# Patient Record
Sex: FEMALE | Race: WHITE
[De-identification: names, ages, dates, MRNs, and addresses within clinical notes are randomized per-mention and may not be internally consistent; named-entity substitution may affect disease eponyms.]

---

## 2017-01-01 ENCOUNTER — HOSPITAL ENCOUNTER (EMERGENCY)
Dept: HOSPITAL 62 - ER | Age: 42
Discharge: HOME | End: 2017-01-01
Payer: SELF-PAY

## 2017-01-01 VITALS — SYSTOLIC BLOOD PRESSURE: 128 MMHG | DIASTOLIC BLOOD PRESSURE: 78 MMHG

## 2017-01-01 DIAGNOSIS — R52: ICD-10-CM

## 2017-01-01 DIAGNOSIS — R10.817: ICD-10-CM

## 2017-01-01 DIAGNOSIS — R50.9: ICD-10-CM

## 2017-01-01 DIAGNOSIS — Z87.891: ICD-10-CM

## 2017-01-01 DIAGNOSIS — J44.9: ICD-10-CM

## 2017-01-01 DIAGNOSIS — R11.2: Primary | ICD-10-CM

## 2017-01-01 LAB
ALBUMIN SERPL-MCNC: 3.7 G/DL (ref 3.5–5)
ALP SERPL-CCNC: 69 U/L (ref 38–126)
ALT SERPL-CCNC: 39 U/L (ref 9–52)
ANION GAP SERPL CALC-SCNC: 9 MMOL/L (ref 5–19)
APPEARANCE UR: (no result)
AST SERPL-CCNC: 21 U/L (ref 14–36)
BASOPHILS # BLD AUTO: 0.1 10^3/UL (ref 0–0.2)
BASOPHILS NFR BLD AUTO: 0.7 % (ref 0–2)
BILIRUB DIRECT SERPL-MCNC: 0 MG/DL (ref 0–0.3)
BILIRUB SERPL-MCNC: 0.6 MG/DL (ref 0.2–1.3)
BILIRUB UR QL STRIP: NEGATIVE
BUN SERPL-MCNC: 12 MG/DL (ref 7–20)
CALCIUM: 8.9 MG/DL (ref 8.4–10.2)
CHLORIDE SERPL-SCNC: 103 MMOL/L (ref 98–107)
CO2 SERPL-SCNC: 27 MMOL/L (ref 22–30)
CREAT SERPL-MCNC: 0.83 MG/DL (ref 0.52–1.25)
EOSINOPHIL # BLD AUTO: 0.5 10^3/UL (ref 0–0.6)
EOSINOPHIL NFR BLD AUTO: 5.2 % (ref 0–6)
ERYTHROCYTE [DISTWIDTH] IN BLOOD BY AUTOMATED COUNT: 14 % (ref 11.5–14)
GLUCOSE SERPL-MCNC: 108 MG/DL (ref 75–110)
GLUCOSE UR STRIP-MCNC: NEGATIVE MG/DL
HCT VFR BLD CALC: 42 % (ref 36–47)
HGB BLD-MCNC: 14.3 G/DL (ref 12–15.5)
HGB HCT DIFFERENCE: 0.9
KETONES UR STRIP-MCNC: NEGATIVE MG/DL
LYMPHOCYTES # BLD AUTO: 3.1 10^3/UL (ref 0.5–4.7)
LYMPHOCYTES NFR BLD AUTO: 32.4 % (ref 13–45)
MCH RBC QN AUTO: 30.9 PG (ref 27–33.4)
MCHC RBC AUTO-ENTMCNC: 34 G/DL (ref 32–36)
MCV RBC AUTO: 91 FL (ref 80–97)
MONOCYTES # BLD AUTO: 0.8 10^3/UL (ref 0.1–1.4)
MONOCYTES NFR BLD AUTO: 8.1 % (ref 3–13)
NEUTROPHILS # BLD AUTO: 5.1 10^3/UL (ref 1.7–8.2)
NEUTS SEG NFR BLD AUTO: 53.6 % (ref 42–78)
NITRITE UR QL STRIP: NEGATIVE
PH UR STRIP: 5 [PH] (ref 5–9)
POTASSIUM SERPL-SCNC: 4.1 MMOL/L (ref 3.6–5)
PROT SERPL-MCNC: 6 G/DL (ref 6.3–8.2)
PROT UR STRIP-MCNC: NEGATIVE MG/DL
RBC # BLD AUTO: 4.63 10^6/UL (ref 3.72–5.28)
SODIUM SERPL-SCNC: 138.7 MMOL/L (ref 137–145)
SP GR UR STRIP: 1.02
UROBILINOGEN UR-MCNC: NEGATIVE MG/DL (ref ?–2)
WBC # BLD AUTO: 9.5 10^3/UL (ref 4–10.5)

## 2017-01-01 PROCEDURE — 81025 URINE PREGNANCY TEST: CPT

## 2017-01-01 PROCEDURE — 99283 EMERGENCY DEPT VISIT LOW MDM: CPT

## 2017-01-01 PROCEDURE — 81001 URINALYSIS AUTO W/SCOPE: CPT

## 2017-01-01 PROCEDURE — 80053 COMPREHEN METABOLIC PANEL: CPT

## 2017-01-01 PROCEDURE — 85025 COMPLETE CBC W/AUTO DIFF WBC: CPT

## 2017-01-01 PROCEDURE — 96374 THER/PROPH/DIAG INJ IV PUSH: CPT

## 2017-01-01 PROCEDURE — 36415 COLL VENOUS BLD VENIPUNCTURE: CPT

## 2017-01-01 PROCEDURE — 96361 HYDRATE IV INFUSION ADD-ON: CPT

## 2017-01-01 PROCEDURE — 71020: CPT

## 2017-01-01 NOTE — ER DOCUMENT REPORT
ED General





- General


Chief Complaint: Flu Symptoms


Stated Complaint: FEVER/VOMITING/DIARRHEA


Time seen by provider: 19:40


Notes: 


Patient is a 41-year-old female that comes emergency department with chief 

complaint of fever and hurting all over today with nausea, she states that 

yesterday she was having nausea and vomiting, also vomited today, denies 

diarrhea, she states that she had a cough but this also seems to be improving.  

Patient denies any specific areas of abdominal pain, denies neck pain, flank 

pain, headache.  Patient unsure of sick contacts.  PMH of PCOS, takes no daily 

medications.





TRAVEL OUTSIDE OF THE [place] IN LAST 30 DAYS: No





- Related Data


Allergies/Adverse Reactions: 


 





Antihistamines - Alkylamine Adverse Reaction (Verified 12/04/16 05:18)


 


"allergy medicine" Adverse Reaction (Uncoded 04/22/16 13:43)


 











Past Medical History





- General


Information source: Patient





- Social History


Smoking Status: Former Smoker


Frequency of alcohol use: None


Drug Abuse: None


Lives with: Family


Family History: Reviewed & Not Pertinent


Pulmonary Medical History: Reports: Hx COPD


Neurological Medical History: Reports: Hx Migraine


Renal/ Medical History: Reports: Hx Kidney Stones, Hx Ovarian Cysts


GI Medical History: Reports: Hx Gastroesophageal Reflux Disease


Musculoskeltal Medical History: Reports Hx Arthritis


Psychiatric Medical History: Reports: Hx Depression


Past Surgical History: Reports: Hx Cholecystectomy, Hx Gynecologic Surgery - 

ovarian surgery, Hx Hysterectomy





- Immunizations


Immunizations up to date: Yes


Hx Diphtheria, Pertussis, Tetanus Vaccination: Yes





Review of Systems





- Review of Systems


Constitutional: See HPI


EENT: No symptoms reported


Cardiovascular: No symptoms reported


Respiratory: See HPI


Gastrointestinal: See HPI


Genitourinary: No symptoms reported


Female Genitourinary: No symptoms reported


Musculoskeletal: See HPI


Skin: No symptoms reported


Hematologic/Lymphatic: No symptoms reported


Neurological/Psychological: No symptoms reported





Physical Exam





- Vital signs


Vitals: 


 











Temp Pulse Resp BP Pulse Ox


 


 98.3 F   70   17   128/78 H  100 


 


 01/01/17 22:53  01/01/17 22:53  01/01/17 22:53  01/01/17 22:53  01/01/17 22:53











Interpretation: Normal





- General


General appearance: Alert, Other - Patient slightly disheveled and slightly 

under the weather in appearance, no distress


In distress: None





- HEENT


Head: Normocephalic, Atraumatic


Eyes: Normal


Conjunctiva: Normal


Extraocular movements intact: Yes


Eyelashes: Normal


Pupils: PERRL


Ears: Normal


External canal: Normal


Tympanic membrane: Normal


Sinus: Normal


Nasal: Normal


Mouth/Lips: Normal


Mucous membranes: Normal


Pharynx: Normal


Neck: Normal





- Respiratory


Respiratory status: No respiratory distress


Chest status: Nontender


Breath sounds: Normal


Chest palpation: Normal





- Cardiovascular


Rhythm: Regular


Heart sounds: Normal auscultation


Murmur: No





- Abdominal


Inspection: Normal


Distension: No distension


Bowel sounds: Normal


Tenderness: Tender - Very mild generalized tenderness, nonspecific, no guarding


Organomegaly: No organomegaly





- Back


Back: Normal, Nontender.  No: Tender





- Extremities


General upper extremity: Normal inspection, Nontender, Normal color, Normal ROM

, Normal temperature


General lower extremity: Normal inspection, Nontender, Normal color, Normal ROM

, Normal temperature, Normal weight bearing.  No: Sheridan's sign





- Neurological


Neuro grossly intact: Yes


Cognition: Normal


Orientation: AAOx4


[place] Coma Scale Eye Opening: Spontaneous


[place] Coma Scale Verbal: Oriented


[place] Coma Scale Motor: Obeys Commands


[place] Coma Scale Total: 15


Speech: Normal


Motor strength normal: LUE, RUE, LLE, RLE


Sensory: Normal





- Psychological


Associated symptoms: Normal affect, Normal mood





- Skin


Skin Temperature: Warm


Skin Moisture: Dry


Skin Color: Normal





Course





- Re-evaluation


Re-evalutation: 


CBC, chemistry, urinalysis unremarkable, chest x-ray unremarkable, suspect 

patient has a viral syndrome.  Treated with antinausea medications, provided 

with work release note.  Discussed with patient, she states she'll follow-up 

with primary care and return for any concerning symptoms.





- Vital Signs


Vital signs: 


 











Temp Pulse Resp BP Pulse Ox


 


 98.3 F   70   17   128/78 H  100 


 


 01/01/17 22:53  01/01/17 22:53  01/01/17 22:53  01/01/17 22:53  01/01/17 22:53














- Laboratory


Result Diagrams: 


 01/01/17 21:00





 01/01/17 21:00


Laboratory results interpreted by me: 


 











  01/01/17 01/01/17





  21:00 21:00


 


Total Protein  6.0 L 


 


Ur Leukocyte Esterase   TRACE H














Discharge





- Discharge


Clinical Impression: 


 Nausea, Body aches





Condition: Stable


Disposition: HOME, SELF-CARE


Additional Instructions: 


Your workup is most consistent with a viral illness. 


Take Zofran or Phenergan for nausea, take Tylenol or ibuprofen for fever, drink 

plenty of fluids and rest.


Follow-up with primary care.


Return to emergency department for any concerning or worsening symptoms.


Prescriptions: 


Promethazine HCl [Phenergan 25 mg Tablet] 1 - 2 tab PO Q6H PRN #20 tablet


 PRN Reason: 


Forms:  Return to Work

## 2017-02-11 ENCOUNTER — HOSPITAL ENCOUNTER (EMERGENCY)
Dept: HOSPITAL 62 - ER | Age: 42
Discharge: HOME | End: 2017-02-11
Payer: SELF-PAY

## 2017-02-11 VITALS — SYSTOLIC BLOOD PRESSURE: 118 MMHG | DIASTOLIC BLOOD PRESSURE: 74 MMHG
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
## 2017-02-11 DIAGNOSIS — F17.210: ICD-10-CM

## 2017-02-11 DIAGNOSIS — T18.9XXA: Primary | ICD-10-CM

## 2017-02-11 DIAGNOSIS — J02.9: ICD-10-CM

## 2017-02-11 DIAGNOSIS — E04.1: ICD-10-CM

## 2017-02-11 PROCEDURE — 70360 X-RAY EXAM OF NECK: CPT

## 2017-02-11 PROCEDURE — 70491 CT SOFT TISSUE NECK W/DYE: CPT

## 2017-02-11 PROCEDURE — 99283 EMERGENCY DEPT VISIT LOW MDM: CPT

## 2017-02-11 NOTE — ER DOCUMENT REPORT
HPI





- HPI


Patient complains to provider of: swallowed glass


Onset: This afternoon


Onset/Duration: Sudden


Quality of pain: Burning


Severity: Severe


Pain Level: 4


Context: 


Patient presents to the emergency department with complaints of sore throat 

after eating a chili dog confining glass in it from [employer].  Patient 

presents with picture of a piece of glass approximately 1 mm on the tip of her 

finger.  Patient is speaking in clear voice.  Patient denies spitting up blood.

  Patient reports something feels like it stuck in her throat.


Associated Symptoms: None


Exacerbated by: Denies


Relieved by: Denies


Similar symptoms previously: No


Recently seen / treated by doctor: No





- REPRODUCTIVE


Reproductive: DENIES: Pregnant:





- DERM


Skin Color: Normal, Pink





Past Medical History





- General


Information source: Patient


Last Menstrual Period: jan





- Social History


Smoking Status: Current Every Day Smoker


Cigarette use (# per day): Yes


Frequency of alcohol use: None


Drug Abuse: None


Occupation: [employer]


Family History: Reviewed & Not Pertinent, Thyroid Disfunction - brother


Patient has suicidal ideation: No


Patient has homicidal ideation: No


Pulmonary Medical History: Reports: Hx COPD


Neurological Medical History: Reports: Hx Migraine


Renal/ Medical History: Reports: Hx Kidney Stones, Hx Ovarian Cysts.  Denies: 

Hx Peritoneal Dialysis


GI Medical History: Reports: Hx Gastroesophageal Reflux Disease


Musculoskeltal Medical History: Reports Hx Arthritis


Psychiatric Medical History: Reports: Hx Depression


Past Surgical History: Reports: Hx Cholecystectomy, Hx Gynecologic Surgery - 

ovarian surgery, Hx Hysterectomy





- Immunizations


Immunizations up to date: Yes


Hx Diphtheria, Pertussis, Tetanus Vaccination: Yes





Vertical Provider Document





- CONSTITUTIONAL


Agree With Documented VS: Yes


Exam Limitations: No Limitations


General Appearance: WD/WN, No Apparent Distress





- INFECTION CONTROL


TRAVEL OUTSIDE OF THE [place] IN LAST 30 DAYS: No





- HEENT


HEENT: Atraumatic, Normocephalic.  negative: Pharyngeal Erythema - No 

peritonsillar abscess good clear voice no trismus, opens mouth wide, no 

lacerations noted, drinking mountain dew





- NECK


Neck: Normal Inspection, Supple.  negative: Lymphadenopathy-Left, 

Lymphadenopathy-Right





- RESPIRATORY


Respiratory: Breath Sounds Normal, No Respiratory Distress


O2 Sat by Pulse Oximetry: 97





- CARDIOVASCULAR


Cardiovascular: Regular Rate





- MUSCULOSKELETAL/EXTREMETIES


Musculoskeletal/Extremeties: MAEW, FROM





- NEURO


Level of Consciousness: Awake, Alert, Appropriate


Motor/Sensory: No Motor Deficit





- DERM


Integumentary: Warm, Dry





Course





- Re-evaluation


Re-evalutation: 





02/11/17 18:28


I have consulted  the attending provider dr oliveira per [facility] guidelines, he agrees 

with ct soft tissue


Reviewed results with Dr Oliveira, pt advised to fu with pcp, [facility] for 

further eval of thyroid nodule. She was instructed to return to ED if unable to 

obtain fu or problems swallowing, she was also instructed to monitor her stool 

for blood. She verbalized understanding.





- Vital Signs


Vital signs: 


 











Temp Pulse Resp BP Pulse Ox


 


 98.1 F   86   20   117/75   97 


 


 02/11/17 13:39  02/11/17 13:39  02/11/17 13:39  02/11/17 13:39  02/11/17 13:39














- Diagnostic Test


Radiology reviewed: Image reviewed, Reports reviewed - IMPRESSION: 1. No CT 

evidence of esophageal foreign body.  2. Nonspecific thyroid nodules as 

detailed above. Consider routine thyroid ultrasound on an  outpatient basis.





Discharge





- Discharge


Clinical Impression: 


 Sore throat, swallowed glass, Thyroid nodule





Condition: Stable


Disposition: HOME, SELF-CARE


Instructions:  Sore Throat ([facility]), Family Physicians / Practices


Additional Instructions: 


*You have been evaluated for after swallowing glass , thyroid nodule


*Warm salt water gargles and throat lozenges for comfort


*Follow-up with your primary care provider within one week for a recheck of 

your throat and evaluation of thyroid with [facility]


*Return to ED for worsening condition change, needs, difficulty swallowing, 

concerns


Forms:  Return to Work

## 2017-06-30 ENCOUNTER — HOSPITAL ENCOUNTER (EMERGENCY)
Dept: HOSPITAL 62 - ER | Age: 42
Discharge: HOME | End: 2017-06-30
Payer: SELF-PAY

## 2017-06-30 VITALS — DIASTOLIC BLOOD PRESSURE: 77 MMHG | SYSTOLIC BLOOD PRESSURE: 134 MMHG

## 2017-06-30 DIAGNOSIS — H92.02: ICD-10-CM

## 2017-06-30 DIAGNOSIS — J44.9: ICD-10-CM

## 2017-06-30 DIAGNOSIS — H60.502: Primary | ICD-10-CM

## 2017-06-30 DIAGNOSIS — Z87.891: ICD-10-CM

## 2017-06-30 PROCEDURE — 99282 EMERGENCY DEPT VISIT SF MDM: CPT

## 2017-06-30 NOTE — ER DOCUMENT REPORT
HPI





- HPI


Patient complains to provider of: left ear pain, rash


Pain Level: 5


Context: 


Patient is a 41-year-old female who comes emergency department for chief 

complaint of left ear pain, she states that this has progressively worsened 

over the past several days, she states that there is also a pimple-like area 

inside of the earlobe that she scratched open and now the area around this has 

become red.  She wonders if she was originally bitten by an insect.  Up-to-date 

on tetanus within 5 years. she denies any fever or chills, drainage from the ear

, dizziness, vomiting, injury, or recent swimming.








- REPRODUCTIVE


Reproductive: DENIES: Pregnant:





- DERM


Skin Color: Normal





Past Medical History





- General


Information source: Patient





- Social History


Smoking Status: Former Smoker


Drug Abuse: None


Lives with: Family


Family History: Reviewed & Not Pertinent, Thyroid Disfunction - brother


Patient has suicidal ideation: No


Patient has homicidal ideation: No


Pulmonary Medical History: Reports: Hx COPD


Neurological Medical History: Reports: Hx Migraine


Renal/ Medical History: Reports: Hx Kidney Stones, Hx Ovarian Cysts.  Denies: 

Hx Peritoneal Dialysis


GI Medical History: Reports: Hx Gastroesophageal Reflux Disease


Musculoskeltal Medical History: Reports Hx Arthritis


Psychiatric Medical History: Reports: Hx Depression


Past Surgical History: Reports: Hx Cholecystectomy, Hx Gynecologic Surgery - 

ovarian surgery, Hx Hysterectomy





- Immunizations


Immunizations up to date: Yes


Hx Diphtheria, Pertussis, Tetanus Vaccination: Yes





Vertical Provider Document





- CONSTITUTIONAL


General Appearance: WD/WN, Mild Distress - Patient appears to be in some pain, 

holding her left ear





- INFECTION CONTROL


TRAVEL OUTSIDE OF THE [place] IN LAST 30 DAYS: No





- HEENT


HEENT: Atraumatic, Normocephalic.  negative: Normal ENT Exam - Eardrums 

unremarkable bilaterally, left ear canal very swollen with erythema, no abscess 

or foreign body noted, no drainage.  Tragus is tender, there is also erythema 

inside of the earlobe with an excoriated area, no induration or fluctuance.  

Mastoid normal.





- NECK


Neck: Normal Inspection





- RESPIRATORY


Respiratory: Breath Sounds Normal, No Respiratory Distress


O2 Sat by Pulse Oximetry: 98





- CARDIOVASCULAR


Cardiovascular: Regular Rate, Regular Rhythm





- GI/ABDOMEN


Gastrointestinal: Abdomen Soft, Abdomen Non-Tender





- MUSCULOSKELETAL/EXTREMETIES


Musculoskeletal/Extremeties: MAEW, FROM, Non-Tender





- NEURO


Level of Consciousness: Awake, Alert, Appropriate





- DERM


Integumentary: Warm, Dry, No Rash





Course





- Re-evaluation


Re-evalutation: 


There is an excoriated area over the external ear with some mild surrounding 

redness but no induration or fluctuance.  Covering the Bactrim.  Ear wick 

placed for otitis externa, patient given Ciprodex drops to continue to use at 

home, patient will be given pain medication, discussed follow-up, discussed 

return precautions, patient states understanding and agreement





- Vital Signs


Vital signs: 


 











Temp Pulse Resp BP Pulse Ox


 


 98.6 F   105 H  18   140/87 H  98 


 


 06/30/17 20:14  06/30/17 20:14  06/30/17 20:14  06/30/17 20:14  06/30/17 20:14














Discharge





- Discharge


Clinical Impression: 


 Left ear pain





Otitis externa


Qualifiers:


 Otitis externa type: unspecified type Chronicity: acute Laterality: left 

Qualified Code(s): H60.502 - Unspecified acute noninfective otitis externa, 

left ear





Condition: Stable


Disposition: HOME, SELF-CARE


Instructions:  Use of Ear Drops ([facility]), Otitis Externa ([facility]), Using Ear Drops 

with a Wick ([facility])


Additional Instructions: 


Examination shows infection of the ear canal, otitis externa, use the wick, 

apply the drops in the wick as the insert describes (take drops for 1 week), 

also take the Bactrim antibiotic from localized skin infection on the outside 

of the ear.  Take pain medication given if needed.  Return to emergency 

department for any concerning or worsening symptoms including dizziness, 

vomiting, fever, swelling of the ear or behind the ear, or any other concerning 

symptoms.


Prescriptions: 


Hydrocodone/Acetaminophen [Norco 5-325 mg Tablet] 1 - 2 tab PO ASDIR #10 tablet


Sulfamethoxazole/Trimethoprim [Bactrim Ds Tablet] 1 each PO BID #14 tablet

## 2018-08-08 ENCOUNTER — HOSPITAL ENCOUNTER (EMERGENCY)
Dept: HOSPITAL 62 - ER | Age: 43
LOS: 1 days | Discharge: HOME | End: 2018-08-09
Payer: SELF-PAY

## 2018-08-08 DIAGNOSIS — L03.116: Primary | ICD-10-CM

## 2018-08-08 DIAGNOSIS — J44.9: ICD-10-CM

## 2018-08-08 PROCEDURE — 99281 EMR DPT VST MAYX REQ PHY/QHP: CPT

## 2018-08-09 VITALS — DIASTOLIC BLOOD PRESSURE: 89 MMHG | SYSTOLIC BLOOD PRESSURE: 147 MMHG

## 2018-08-09 NOTE — ER DOCUMENT REPORT
ED General





- General


Chief Complaint: Insect Bite


Stated Complaint: INFECTED BITE,BACK PAIN


Time Seen by Provider: 08/09/18 00:33


Notes: 





Patient is a 43-year-old female who presents with complaint of a red spot on 

her left thigh that she thinks may be infected.  She denies any fevers.  She 

has been there for about 2 days.  No other complaints at this time.  No 

previous history of abscesses.  She does not member pulling any bugs off or any 

ticks off her in this area.


TRAVEL OUTSIDE OF THE [place] IN LAST 30 DAYS: No





- Related Data


Allergies/Adverse Reactions: 


 





Antihistamines - Alkylamine Adverse Reaction (Verified 12/04/16 05:18)


 


"allergy medicine" Adverse Reaction (Uncoded 04/22/16 13:43)


 











Past Medical History





- Social History


Smoking Status: Unknown if Ever Smoked


Frequency of alcohol use: None


Drug Abuse: None


Family History: Reviewed & Not Pertinent, Thyroid Disfunction - brother


Pulmonary Medical History: Reports: Hx COPD


Neurological Medical History: Reports: Hx Migraine


Renal/ Medical History: Reports: Hx Kidney Stones, Hx Ovarian Cysts.  Denies: 

Hx Peritoneal Dialysis


GI Medical History: Reports: Hx Gastroesophageal Reflux Disease


Musculoskeletal Medical History: Reports Hx Arthritis


Psychiatric Medical History: Reports: Hx Depression


Past Surgical History: Reports: Hx Cholecystectomy, Hx Gynecologic Surgery - 

ovarian surgery, Hx Hysterectomy





- Immunizations


Immunizations up to date: Yes


Hx Diphtheria, Pertussis, Tetanus Vaccination: Yes





Review of Systems





- Review of Systems


Notes: 





My Normal Review Basic





REVIEW OF SYSTEMS:


CONSTITUTIONAL :  Denies fever,  chills, or sweats.  Denies recent illness.


MUSCULOSKELETAL: Painful red lesion on left thigh.


SKIN:   Denies rash or skin lesions.


ALL OTHER SYSTEMS REVIEWED AND NEGATIVE.





Physical Exam





- Vital signs


Vitals: 


 











Temp Pulse Resp BP Pulse Ox


 


 98.0 F   92   18   133/86 H  98 


 


 08/08/18 23:01  08/08/18 23:01  08/08/18 23:01  08/08/18 23:01  08/08/18 23:01














- Notes


Notes: 





General Appearance: Well nourished, alert, cooperative, no acute distress, no 

obvious discomfort.


Vitals: reviewed, See vital signs table.


Eyes: PERRL, EOMI, Conjuctiva clear


Extremities:  good pulses in all extremities, shins has a 1 cm area of 

induration with approximately 3 cm of surrounding erythema over the left thigh.

  No significant fluctuance.  Consistent with infected hair follicle.


Skin: warm, dry, appropriate color, no rash


Neuro: speech clear, oriented x 3, normal affect, responds appropriately to 

questions.





Course





- Re-evaluation


Re-evalutation: 





08/09/18 07:24


Point quick needle aspiration over the area of induration see if there is an 

associated pus.  There is no pus aspirated.  Patient was placed on clindamycin.

  She is encouraged to return to ER if she has spreading redness, increasing 

swelling or induration, fevers, or she feels that she is worsening in any way.  

Patient agrees with plan will be discharged home.





Dictation of this chart was performed using voice recognition software; 

therefore, there may be some unintended grammatical errors.





- Vital Signs


Vital signs: 


 











Temp Pulse Resp BP Pulse Ox


 


 97.8 F   81   16   147/89 H  97 


 


 08/09/18 01:41  08/09/18 01:41  08/09/18 01:41  08/09/18 01:41  08/09/18 01:41














Discharge





- Discharge


Clinical Impression: 


 Hair follicle infection





Cellulitis


Qualifiers:


 Site of cellulitis: extremity Site of cellulitis of extremity: lower extremity 

Laterality: left Qualified Code(s): L03.116 - Cellulitis of left lower limb





Condition: Good


Disposition: HOME, SELF-CARE


Additional Instructions: 


Please take the antibiotics as prescribed. please apply warm compresses. Please 

return to the ER if you have spreading redness, fevers, increasing swelling, or 

feel that your infection is worsening.


Prescriptions: 


Clindamycin HCl 300 mg PO ASDIR #56 capsule


Forms:  Return to Work


Referrals: 


BLAKE SALAMANCA MD [Primary Care Provider] - Follow up in 3-5 days

## 2019-10-07 NOTE — ER DOCUMENT REPORT
ED Medical Screen (RME)





- General


Chief Complaint: Flank Pain


Stated Complaint: FLANK PAIN


Time Seen by Provider: 10/07/19 19:10


Primary Care Provider: 


BLAKE SALAMANCA MD [Primary Care Provider] - Follow up as needed


Mode of Arrival: Ambulatory


Information source: Patient


Notes: 





44-year-old female presents to ED for complaint of cough cold congestion fatigue

renal insufficiency and bilateral flank pain worse on the right.  She states she

is also been nauseated but has not vomited.  She states she just is so fatigued 

that she can barely go to work.  She is alert oriented respirations regular and 

unlabored.  She states she does have a history of renal insufficiency PCOS 

vertigo and kidney stones.  She has had a gallbladder removed and kidney stones 

removed.  She states she does smoke 15 cigarettes a day drinks monthly does not 

do any illicit drugs.














I have greeted and performed a rapid initial assessment of this patient.  A 

comprehensive ED assessment and evaluation of the patient, analysis of test 

results and completion of medical decision making process will be conducted by 

an additional ED providers.


TRAVEL OUTSIDE OF THE [place] IN LAST 30 DAYS: No





- Related Data


Allergies/Adverse Reactions: 


                                        





Antihistamines - Alkylamine Adverse Reaction (Verified 12/04/16 05:18)


   


"allergy medicine" Adverse Reaction (Uncoded 04/22/16 13:43)


   











Past Medical History


Pulmonary Medical History: Reports: Hx COPD


Neurological Medical History: Reports: Hx Migraine


Renal/ Medical History: Reports: Hx Kidney Stones, Hx Ovarian Cysts.  Denies: 

Hx Peritoneal Dialysis


GI Medical History: Reports: Hx Gastroesophageal Reflux Disease


Musculoskeltal Medical History: Reports Hx Arthritis


Psychiatric Medical History: Reports: Hx Depression


Past Surgical History: Reports: Hx Cholecystectomy, Hx Gynecologic Surgery - 

ovarian surgery, Hx Hysterectomy





- Immunizations


Immunizations up to date: Yes


Hx Diphtheria, Pertussis, Tetanus Vaccination: Yes





Physical Exam





- Vital signs


Vitals: 





                                        











Temp Pulse Resp BP Pulse Ox


 


 99.0 F   91   18   132/80 H  98 


 


 10/07/19 17:45  10/07/19 17:45  10/07/19 17:45  10/07/19 17:45  10/07/19 17:45














Course





- Vital Signs


Vital signs: 





                                        











Temp Pulse Resp BP Pulse Ox


 


 99.0 F   91   18   132/80 H  98 


 


 10/07/19 17:45  10/07/19 17:45  10/07/19 17:45  10/07/19 17:45  10/07/19 17:45














Doctor's Discharge





- Discharge


Referrals: 


BLAKE SALAMANCA MD [Primary Care Provider] - Follow up as needed

## 2019-10-07 NOTE — RADIOLOGY REPORT (SQ)
EXAM DESCRIPTION:  CHEST 2 VIEWS



COMPLETED DATE/TIME:  10/7/2019 7:51 pm



REASON FOR STUDY:  Cough congestion



COMPARISON:  1/1/2017



EXAM PARAMETERS:  NUMBER OF VIEWS: two views

TECHNIQUE: Digital Frontal and Lateral radiographic views of the chest acquired.

RADIATION DOSE: NA

LIMITATIONS: none



FINDINGS:  LUNGS AND PLEURA: No opacities, masses or pneumothorax. No pleural effusion.

MEDIASTINUM AND HILAR STRUCTURES: No masses or contour abnormalities.

HEART AND VASCULAR STRUCTURES: Heart normal size.  No evidence for failure.

BONES: No acute findings.

HARDWARE: None in the chest.

OTHER: No other significant finding.



IMPRESSION:  NO ACUTE RADIOGRAPHIC FINDING IN THE CHEST.



TECHNICAL DOCUMENTATION:  JOB ID:  [id number]

 2011 [employer]- All Rights Reserved



Reading location - IP/workstation name: [email or web address]

## 2019-10-07 NOTE — ER DOCUMENT REPORT
ED GI/





- General


Chief Complaint: Flank Pain


Stated Complaint: FLANK PAIN


Time Seen by Provider: 10/07/19 19:10


Primary Care Provider: 


BLAKE SALAMANCA MD [NO LOCAL MD] - Follow up as needed


Mode of Arrival: Ambulatory


Information source: Patient


Notes: 





44-year-old female presented to ED for complaint of cough cold congestion 

fatigue and renal insufficiency and bilateral flank pain worse tonight.  She 

states she has been nauseated but has not vomited.  She states she is so 

fatigued she can barely go to work.  She was alert oriented respirations regular

and unlabored.  I did see her earlier in the day completed labs x-ray and renal 

ultrasound.  Discussed labs and x-ray and ultrasound with patient.  There were 

no significant abnormalities noted.  I did discuss these results and instructed 

patient to follow-up with her primary doctor.


TRAVEL OUTSIDE OF THE [place] IN LAST 30 DAYS: No





- HPI


Patient complains to provider of: Flank pain, Other - URI.  No: Vomiting


Onset: Last week


Timing/Duration: Persistent, Worse


Quality of pain: Achy


Severity at maximum: Moderate


Severity in ED: Moderate


Pain Level: 3


Location: Left flank, Right flank


Vaginal bleeding (Compared to normal period): None


Associated symptoms: Nausea, Other - Bilateral flank pain worse on the right, 

cough congestion runny nose


Exacerbated by: Movement


Relieved by: Denies


Similar symptoms previously: Yes


Recently seen / treated by doctor: No





- Related Data


Allergies/Adverse Reactions: 


                                        





Antihistamines - Alkylamine Adverse Reaction (Verified 12/04/16 05:18)


   


"allergy medicine" Adverse Reaction (Uncoded 04/22/16 13:43)


   











Past Medical History





- General


Information source: Patient





- Social History


Smoking Status: Current Every Day Smoker


Cigarette use (# per day): Yes - 15 cigarettes a day


Smoking Education Provided: Yes - 4 minutes


Drug Abuse: None


Lives with: Family


Family History: Reviewed & Not Pertinent, Thyroid Disfunction - brother


Patient has suicidal ideation: No


Patient has homicidal ideation: No





- Past Medical History


Cardiac Medical History: Reports: None


Pulmonary Medical History: Reports: None


EENT Medical History: Reports: None


Neurological Medical History: Reports: Hx Migraine


Endocrine Medical History: Reports: None


Renal/ Medical History: Reports: Hx Kidney Stones, Hx Ovarian Cysts


Malignancy Medical History: Reports: None


GI Medical History: Reports: Hx Gastroesophageal Reflux Disease


Musculoskeletal Medical History: Reports Hx Arthritis


Skin Medical History: Reports None


Psychiatric Medical History: Reports: None, Hx Depression


Traumatic Medical History: Reports: None


Infectious Medical History: Reports: None


Past Surgical History: Reports: Hx Cholecystectomy, Hx Gynecologic Surgery - 

ovarian surgery





- Immunizations


Immunizations up to date: Yes


Hx Diphtheria, Pertussis, Tetanus Vaccination: Yes





Review of Systems





- Review of Systems


Constitutional: Recent illness


EENT: No symptoms reported, Nose congestion, Nose discharge, Sinus discharge


Cardiovascular: No symptoms reported


Respiratory: Cough


Gastrointestinal: Nausea


Genitourinary: Flank pain


Female Genitourinary: No symptoms reported


Musculoskeletal: No symptoms reported


Skin: No symptoms reported


Hematologic/Lymphatic: No symptoms reported


Neurological/Psychological: No symptoms reported


-: Yes All other systems reviewed and negative





Physical Exam





- Vital signs


Vitals: 


                                        











Temp Pulse Resp BP Pulse Ox


 


 99.0 F   91   18   132/80 H  98 


 


 10/07/19 17:45  10/07/19 17:45  10/07/19 17:45  10/07/19 17:45  10/07/19 17:45











Interpretation: Normal





- General


General appearance: Appears well, Alert





- HEENT


Head: Normocephalic, Atraumatic


Eyes: Normal


Pupils: PERRL


Ears: Normal


External canal: Normal


Tympanic membrane: Normal


Sinus: Normal


Nasal: Purulent discharge, Swelling


Mouth/Lips: Normal


Mucous membranes: Normal


Pharynx: Post nasal drainage


Neck: Normal





- Respiratory


Respiratory status: No respiratory distress


Chest status: Nontender


Breath sounds: Nonproductive cough.  No: Rales, Rhonchi, Stridor, Wheezing


Chest palpation: Normal





- Cardiovascular


Rhythm: Regular


Heart sounds: Normal auscultation


Murmur: No





- Abdominal


Inspection: Normal


Distension: No distension


Bowel sounds: Normal


Tenderness: Nontender


Organomegaly: No organomegaly





- Back


Back: Normal, Tender, CVA tenderness - Bilateral.  No: Vertebra tenderness, 

Scars, Scoliosis, Wounds





- Extremities


General upper extremity: Normal inspection, Nontender, Normal color, Normal ROM,

Normal temperature


General lower extremity: Normal inspection, Nontender, Normal color, Normal ROM,

Normal temperature, Normal weight bearing.  No: Sheridan's sign





- Neurological


Neuro grossly intact: Yes


Cognition: Normal


Orientation: AAOx4


[place] Coma Scale Eye Opening: Spontaneous


[place] Coma Scale Verbal: Oriented


[place] Coma Scale Motor: Obeys Commands


[place] Coma Scale Total: 15


Speech: Normal


Motor strength normal: LUE, RUE, LLE, RLE


Sensory: Normal





- Psychological


Associated symptoms: Normal affect, Normal mood





- Skin


Skin Temperature: Warm


Skin Moisture: Dry


Skin Color: Normal





Course





- Re-evaluation


Re-evalutation: 





10/08/19 03:00


Discussed x-ray and ultrasound as well as labs with patient and written report 

of labs ultrasound and x-ray given to patient.  Patient was discharged home 

instructed to follow-up with primary care doctor.





- Vital Signs


Vital signs: 


                                        











Temp Pulse Resp BP Pulse Ox


 


 98.6 F   74   18   137/91 H  98 


 


 10/08/19 00:04  10/08/19 00:04  10/07/19 17:45  10/08/19 00:04  10/08/19 00:04














- Laboratory


Result Diagrams: 


                                 10/07/19 20:00





                                 10/07/19 20:00


Laboratory results interpreted by me: 


                                        











  10/07/19 10/07/19





  20:00 20:00


 


Est GFR (MDRD) Non-Af  59 L 


 


Urine Blood   MODERATE H














- Diagnostic Test


Radiology reviewed: Image reviewed, Reports reviewed





Discharge





- Discharge


Clinical Impression: 


 Flank pain





URI (upper respiratory infection)


Qualifiers:


 URI type: unspecified viral URI Qualified Code(s): J06.9 - Acute upper 

respiratory infection, unspecified





Condition: Stable


Disposition: HOME, SELF-CARE


Instructions:  Family Physicians / Practices


Additional Instructions: 


Flank Pain





     We weren't able to prove an exact cause for your flank pain. Pain in the 

flank can be caused by a muscle strain or spasm. Sometimes a kidney stone causes

pain, but can't be found on our tests. Infection in the kidney should be evident

on a urine test. Early shingles can occasionally cause flank pain, without the 

rash that proves the diagnosis. On rare occasions, disease of the pancreas, 

aorta, spleen, or colon can create pain in the flank.


     At this time, there's no evidence of a dangerous condition, and it seems 

safe for you to be at home. If the pain goes away and does not come back, no 

further testing will be needed. If pain persists, or becomes more severe, we may

need to repeat some tests or order additional new testing.


     Blood in the urine, urgency to urinate frequently, and pain that radiates 

to the groin can indicate a kidney stone. Fever may mean that the pain is due to

infection, either of the kidney or the colon (diverticulitis). If your pain is 

early shingles, you should develop an eruption of blisters in the painful area 

within a few days. 


     Call the doctor or return if you have pain that is spreading or becoming 

more severe, pain that does not resolve with time, fever, or any other new 

symptoms.





UPPER RESPIRATORY ILLNESS:





     You have a viral infection of the respiratory passages -- a "cold."  This 

common infection causes nasal congestion, drainage, and often sore throat and 

cough.  It is highly contagious.  The disease usually lasts about 10 to 14 days.


     There is no "cure" for the viral infection -- it must run its course.  If 

there is a complication, such as bacterial infection in the nose, sinuses, 

middle ear, or bronchial tubes, antibiotics may be required.  The antibiotics 

won't affect the virus.


     Drink plenty of fluids.  A humidifier may help.  An expectorant medication 

or decongestant may make you more comfortable.  Use acetaminophen or ibuprofen 

for fever or aches.


     See the doctor if fever persists over two days, if there is any significant

worsening of your symptoms, or if you simply fail to improve as expected.








DECONGESTANT MEDICATION:


     A decongestant medicine has been suggest.  Often this medicine is combined 

in the same tablet with an antihistamine or expectorant. This type of medicine 

is helpful in treating a bad cold or sinus condition, as well as in treatment of

the nasal congestion of hay fever.  It is not of much benefit for lung infecti

ons.


     Decongestant medicines are related to stimulants.  They can cause an 

increase in blood pressure and heart rate.  Persons with heart disease and high 

blood pressure should not take decongestants without discussing this with the 

physician.


     If you develop palpitations, chest pain, headache, or tremors, stop the 

medicine and consult your physician.








COUGH-SUPPRESSANT & EXPECTORANT MEDICATION:


     You are to use a cough medication as needed for relief of symptoms.  This 

medicine is a combination of an expectorant (to make the mucous thinner and more

easily "coughed up") and a cough suppressant (to reduce the frequency of 

coughing).


     The cough-suppressant medicine is related to narcotics.  You may experience

mild nausea and sleepiness.  Some patients who are very sensitive to narcotics 

may have stomach pain from this medicine. Taking the medicine with food reduces 

these side effects.  Do not drive or work with machinery until you know how this

medicine affects you.


     The expectorant should have no side effects.  Iodine-containing 

expectorants (such as organidin) should not be taken by persons with active 

thyroid disease unless approved by your doctor.


     Call the doctor if you develop shortness of breath, hives, rash, itching, 

lightheadedness, or severe nausea and vomiting.








USE OF ACETAMINOPHEN (Tylenol):


     Acetaminophen may be taken for pain relief or fever control. It's much 

safer than aspirin, offering a wider range of "safe" dosages.  It is safe during

pregnancy.  Some brand names are Tylenol, Panadol, Datril, Anacin 3, Tempra, and

Liquiprin. Acetaminophen can be repeated every four hours.  The following are 

maximum recommended dosages:


>89 pounds or adults          650 mg to 900 mg


Acetaminophen can be repeated every four hours.  Maximum dose not to exceed 4000

mg a day.








SMOKING:


     If you smoke, you should stop smoking.  The tar and chemicals in cigarette 

smoke are harmful.  Smoking has been shown to cause:


          emphysema


          chronic bronchitis


          lung cancer


          mouth and throat cancer


          stomach and pancreas cancer


          premature aging


          birth defects


     In addition, smoking increases ear and lung infections in children of 

smokers.








FOLLOW-UP CARE:


If you have been referred to a physician for follow-up care, call the 

physicians office for an appointment as you were instructed or within the next 

two days.  If you experience worsening or a significant change in your symptoms,

notify the physician immediately or return to the Emergency Department at any 

time for re-evaluation.





Forms:  Elevated Blood Pressure, Smoking Cessation Education, Return to Work


Referrals: 


BLAKE SALAMANCA MD [NO LOCAL MD] - Follow up as needed

## 2019-12-03 NOTE — RADIOLOGY REPORT (SQ)
EXAM DESCRIPTION:  CHEST 2 VIEWS



COMPLETED DATE/TIME:  12/3/2019 12:16 pm



REASON FOR STUDY:  cough



COMPARISON:  10/7/2019



EXAM PARAMETERS:  NUMBER OF VIEWS: two views

TECHNIQUE: Digital Frontal and Lateral radiographic views of the chest acquired.

RADIATION DOSE: NA

LIMITATIONS: none



FINDINGS:  LUNGS AND PLEURA: No opacities, masses or pneumothorax. No pleural effusion.

MEDIASTINUM AND HILAR STRUCTURES: No masses or contour abnormalities.

HEART AND VASCULAR STRUCTURES: Heart normal size.  No evidence for failure.

BONES: No acute findings.

HARDWARE: None in the chest.

OTHER: No other significant finding.



IMPRESSION:  NO ACUTE RADIOGRAPHIC FINDING IN THE CHEST.



TECHNICAL DOCUMENTATION:  JOB ID:  [id number]

 2011 [employer]- All Rights Reserved



Reading location - IP/workstation name: [email or web address]

## 2019-12-03 NOTE — ER DOCUMENT REPORT
Entered by JESSICA MAC SCRIBE  12/03/19 7602 





Acting as scribe for:PATRICIA LOUIS DO





ED General





- General


Chief Complaint: Cough


Stated Complaint: COUGH


Time Seen by Provider: 12/03/19 11:30


Mode of Arrival: Ambulatory


Information source: Patient


Notes: 





This 44-year-old female patient presents to the emergency department today with 

complaints of shortness of breath and a cough.  Patient states her shortness of 

breath gets worse if she tries to lie down.  Patient is a smoker and continues 

to smoke.  Patient states she will not take oral steroids as they "pack on the 

pounds".


TRAVEL OUTSIDE OF THE [place] IN LAST 30 DAYS: No





- Related Data


Allergies/Adverse Reactions: 


                                        





Antihistamines - Alkylamine Adverse Reaction (Verified 12/04/16 05:18)


   


"allergy medicine" Adverse Reaction (Uncoded 04/22/16 13:43)


   











Past Medical History





- General


Information source: Patient





- Social History


Smoking Status: Current Every Day Smoker


Cigarette use (# per day): Yes


Chew tobacco use (# tins/day): No


Frequency of alcohol use: Occasional


Drug Abuse: None


Lives with: Family


Family History: Reviewed & Not Pertinent, Thyroid Disfunction - brother


Patient has suicidal ideation: No


Patient has homicidal ideation: No


Pulmonary Medical History: Reports: Hx COPD


Neurological Medical History: Reports: Hx Migraine


Renal/ Medical History: Reports: Hx Kidney Stones, Hx Ovarian Cysts.  Denies: 

Hx Peritoneal Dialysis


GI Medical History: Reports: Hx Gastroesophageal Reflux Disease


Musculoskeletal Medical History: Reports Hx Arthritis


Psychiatric Medical History: Reports: Hx Depression


Past Surgical History: Reports: Hx Cholecystectomy, Hx Gynecologic Surgery - 

ovarian surgery, Hx Hysterectomy





- Immunizations


Immunizations up to date: Yes


Hx Diphtheria, Pertussis, Tetanus Vaccination: Yes





Review of Systems





- Review of Systems


Constitutional: No symptoms reported


EENT: No symptoms reported


Cardiovascular: No symptoms reported


Respiratory: See HPI, Cough, Short of breath


Gastrointestinal: See HPI, Nausea


Genitourinary: No symptoms reported


Female Genitourinary: No symptoms reported


Musculoskeletal: No symptoms reported


Skin: No symptoms reported


Hematologic/Lymphatic: No symptoms reported


Neurological/Psychological: No symptoms reported


-: Yes All other systems reviewed and negative





Physical Exam





- Vital signs


Vitals: 


                                        











Temp Pulse Resp BP Pulse Ox


 


 98.1 F   91   16   128/105 H  95 


 


 12/03/19 11:28  12/03/19 11:28  12/03/19 11:28  12/03/19 11:28  12/03/19 11:28











Interpretation: Normal





- General


General appearance: Appears well, Alert





- HEENT


Head: Normocephalic, Atraumatic


Eyes: Normal


Pupils: PERRL


Nasal: Clear rhinorrhea


Mucous membranes: Dry





- Respiratory


Respiratory status: No respiratory distress


Chest status: Nontender


Breath sounds: Normal


Chest palpation: Normal





- Cardiovascular


Rhythm: Regular


Heart sounds: Normal auscultation


Murmur: No





- Abdominal


Inspection: Normal


Distension: No distension


Bowel sounds: Normal


Tenderness: Nontender


Organomegaly: No organomegaly





- Back


Back: Normal, Nontender





- Extremities


General upper extremity: Normal inspection, Nontender, Normal color, Normal ROM,

Normal temperature


General lower extremity: Normal inspection, Nontender, Normal color, Normal ROM,

Normal temperature, Normal weight bearing.  No: Sheridan's sign





- Neurological


Neuro grossly intact: Yes


Cognition: Normal


Orientation: AAOx4


[place] Coma Scale Eye Opening: Spontaneous


[place]enny Coma Scale Verbal: Oriented


[place]enny Coma Scale Motor: Obeys Commands


[place]enny Coma Scale Total: 15


Speech: Normal


Motor strength normal: LUE, RUE, LLE, RLE


Sensory: Normal





- Psychological


Associated symptoms: Normal affect, Normal mood





- Skin


Skin Temperature: Warm


Skin Moisture: Dry


Skin Color: Normal





Course





- Re-evaluation


Re-evalutation: 





12/03/19 18:15


Patient is a 44-year-old female who comes in complaining about a cough.  States 

that is worse at night when she lays down.  His upper respiratory symptoms.  P

atient still smokes.  No evidence for wheezing.  Blood work and chest x-ray 

within normal limits.  Patient will be given a prescription for fluticasone as 

symptoms are consistent with postnasal drip.  Does not want to do an oral 

steroid.  Stable for discharge.  Return if further concerns.





- Vital Signs


Vital signs: 


                                        











Temp Pulse Resp BP Pulse Ox


 


 98.0 F   80   16   144/83 H  98 


 


 12/03/19 17:23  12/03/19 17:23  12/03/19 17:23  12/03/19 17:23  12/03/19 17:23














- Laboratory


Result Diagrams: 


                                 12/03/19 12:00





                                 12/03/19 12:00


Laboratory results interpreted by me: 


                                        











  12/03/19 12/03/19 12/03/19





  12:00 12:00 12:00


 


RDW  14.1 H  


 


Carbon Dioxide   32 H 


 


Urine Blood    MODERATE H


 


Ur Leukocyte Esterase    MODERATE H














Discharge





- Discharge


Clinical Impression: 


 Postnasal drip





Upper respiratory infection


Qualifiers:


 URI type: unspecified URI Qualified Code(s): J06.9 - Acute upper respiratory 

infection, unspecified





Condition: Stable


Disposition: HOME, SELF-CARE


Instructions:  Upper Respiratory Illness ([facility])


Prescriptions: 


Fluticasone Propionate 15.8 ml NS BID #1 spray.susp





I personally performed the services described in the documentation, reviewed and

edited the documentation which was dictated to the scribe in my presence, and it

accurately records my words and actions.

## 2019-12-03 NOTE — ER DOCUMENT REPORT
ED Medical Screen (RME)





- General


Chief Complaint: Cold Symptoms


Stated Complaint: COUGH


Time Seen by Provider: 12/03/19 11:30


Notes: 





44-year-old female presents to the emergency department with multiple chief 

complaints.  Patient has had a hacking persistent cough x2 days that is 

nonproductive causing chest wall pain.  Patient is also having right flank pain 

that radiates around to her right lower quadrant.  She does have history of 

nephrolithiasis.  States that her urine is darker in color but has no urinary 

frequency, urgency, dysuria.  Patient denies fevers or chills, denies shortness 

of breath, denies central chest pain, denies luis fernando

sea/vomiting/diarrhea/constipation





Exam: Lungs are clear to auscultation in all fields, regular cardiac rate and 

rhythm S1-S2 heard no murmurs, mild right CVAT, abdominal exam limited in triage

room and absence of stretcher bed





I have greeted and performed a rapid initial assessment of this patient.  A 

comprehensive ED assessment and evaluation of the patient, analysis of test 

results and completion of medical decision making process will be conducted by 

an additional ED providers.


TRAVEL OUTSIDE OF THE [place] IN LAST 30 DAYS: No





- Related Data


Allergies/Adverse Reactions: 


                                        





Antihistamines - Alkylamine Adverse Reaction (Verified 12/04/16 05:18)


   


"allergy medicine" Adverse Reaction (Uncoded 04/22/16 13:43)


   











Past Medical History


Pulmonary Medical History: Reports: Hx COPD


Neurological Medical History: Reports: Hx Migraine


Renal/ Medical History: Reports: Hx Kidney Stones, Hx Ovarian Cysts.  Denies: 

Hx Peritoneal Dialysis


GI Medical History: Reports: Hx Gastroesophageal Reflux Disease


Musculoskeltal Medical History: Reports Hx Arthritis


Psychiatric Medical History: Reports: Hx Depression


Past Surgical History: Reports: Hx Cholecystectomy, Hx Gynecologic Surgery - 

ovarian surgery, Hx Hysterectomy





- Immunizations


Immunizations up to date: Yes


Hx Diphtheria, Pertussis, Tetanus Vaccination: Yes





Physical Exam





- Vital signs


Vitals: 





                                        











Temp Pulse Resp BP Pulse Ox


 


 98.1 F   91   16   128/105 H  95 


 


 12/03/19 11:28  12/03/19 11:28  12/03/19 11:28  12/03/19 11:28  12/03/19 11:28














Course





- Vital Signs


Vital signs: 





                                        











Temp Pulse Resp BP Pulse Ox


 


 98.1 F   91   16   128/105 H  95 


 


 12/03/19 11:28  12/03/19 11:28  12/03/19 11:28  12/03/19 11:28  12/03/19 11:28

## 2020-08-26 NOTE — EKG REPORT
SEVERITY:- BORDERLINE ECG -

SINUS RHYTHM

[facility] BY VOLTAGE

:

Confirmed by: Nia Meneses MD 26-Aug-2020 21:48:30

## 2020-08-26 NOTE — ER DOCUMENT REPORT
ED Medical Screen (RME)





- General


Chief Complaint: Palpitations


Stated Complaint: FAST HEART RATE,LEFT ARM TINGLING


Time Seen by Provider: 08/26/20 11:05


Mode of Arrival: Ambulatory


Information source: Patient


Notes: 





Patient presents complaining of midsternal chest discomfort that she describes 

as a heaviness for the past 5 days.  Patient states she has had left arm 

tingling today which prompted her visit.  Patient states periodically her heart 

rate will race and has been as high as 126.  Patient states she occasionally 

gets nauseated and diaphoretic.  Patient denies any cough or cold symptoms.  

Patient denies any significant past medical history.





I have greeted and performed a rapid initial assessment of this patient.  A 

comprehensive ED assessment and evaluation of the patient, analysis of test 

results and completion of the medical decision making process will be conducted 

by additional ED providers.


TRAVEL OUTSIDE OF THE [place] IN LAST 30 DAYS: No





- Related Data


Allergies/Adverse Reactions: 


                                        





Antihistamines - Alkylamine Adverse Reaction (Verified 08/26/20 11:05)


   


"allergy medicine" Adverse Reaction (Uncoded 08/26/20 11:05)


   











Past Medical History


Pulmonary Medical History: Reports: Hx COPD


Neurological Medical History: Reports: Hx Migraine


Renal/ Medical History: Reports: Hx Kidney Stones, Hx Ovarian Cysts.  Denies: 

Hx Peritoneal Dialysis


GI Medical History: Reports: Hx Gastroesophageal Reflux Disease


Musculoskeltal Medical History: Reports Hx Arthritis


Psychiatric Medical History: Reports: Hx Depression


Past Surgical History: Reports: Hx Cholecystectomy, Hx Gynecologic Surgery - 

ovarian surgery, Hx Hysterectomy





- Immunizations


Immunizations up to date: Yes


Hx Diphtheria, Pertussis, Tetanus Vaccination: Yes





Physical Exam





- Vital signs


Vitals: 





                                        











Temp Pulse Resp BP Pulse Ox


 


 98.1 F   88   16   154/83 H  98 


 


 08/26/20 11:04  08/26/20 11:04 08/26/20 11:04 08/26/20 11:04 08/26/20 11:04














- General


General appearance: Appears well, Alert





- Respiratory


Respiratory status: No respiratory distress


Breath sounds: Normal





- Cardiovascular


Rhythm: Regular.  No: Tachycardia


Heart sounds: S1 appreciated, S2 appreciated





Course





- Vital Signs


Vital signs: 





                                        











Temp Pulse Resp BP Pulse Ox


 


 98.1 F   88   16   154/83 H  98 


 


 08/26/20 11:04  08/26/20 11:04  08/26/20 11:04 08/26/20 11:04  08/26/20 11:04

## 2020-08-26 NOTE — ER DOCUMENT REPORT
ED General





- General


Chief Complaint: Palpitations


Stated Complaint: FAST HEART RATE,LEFT ARM TINGLING


Time Seen by Provider: 08/26/20 11:05


Mode of Arrival: Ambulatory


TRAVEL OUTSIDE OF THE [place] IN LAST 30 DAYS: No





- HPI


Notes: 





45-year-old female presents with multiple complaints.  Patient states that since

Friday, 5 days ago, she has been feeling chest tightness and palpitations.  She 

states that the chest tightness, occasionally pressure, has been constant for 

the past 5 days.  Seems to come on when she is at work, some resolution at the 

end of the day.  She cannot identify any aggravating or relieving factors.  She 

states that today she had a sensation of pain and numbness in her left arm and 

left leg.  The pain in her left arm was located about her antecubital fossa.  

Additionally states that she has been checking her blood pressure at work for 

the past 2 to 3 months, she has been noticing high blood pressure, typically is 

in the 150s, occasionally her pulse will be elevated as well, recently was 126. 

She feels palpitations with this.  She reports generalized fatigue, feels sweaty

all the time, some nausea and decreased appetite.  She states concerned that she

might be going through menopause.  Has a history of PCOS and has chronic crampy 

lower abdominal pain.





- Related Data


Allergies/Adverse Reactions: 


                                        





Antihistamines - Alkylamine Adverse Reaction (Verified 08/26/20 11:05)


   


"allergy medicine" Adverse Reaction (Uncoded 08/26/20 11:05)


   








Home Medications: denies





Past Medical History





- General


Information source: Patient





- Social History


Smoking Status: Current Every Day Smoker


Chew tobacco use (# tins/day): No


Frequency of alcohol use: Social


Drug Abuse: None


Family History: Thyroid Disfunction - brother


Patient has homicidal ideation: No


Pulmonary Medical History: Reports: Hx COPD


Neurological Medical History: Reports: Hx Migraine


Renal/ Medical History: Reports: Hx Kidney Stones, Hx Ovarian Cysts.  Denies: 

Hx Peritoneal Dialysis


GI Medical History: Reports: Hx Gastroesophageal Reflux Disease


Musculoskeletal Medical History: Reports Hx Arthritis


Psychiatric Medical History: Reports: Hx Depression


Past Surgical History: Reports: Hx Cholecystectomy, Hx Gynecologic Surgery - 

ovarian surgery, Hx Hysterectomy





- Immunizations


Immunizations up to date: Yes


Hx Diphtheria, Pertussis, Tetanus Vaccination: Yes





Review of Systems





- Review of Systems


Constitutional: denies: Fever


EENT: denies: Double vision


Cardiovascular: Chest pain, Palpitations


Respiratory: denies: Short of breath


Gastrointestinal: Nausea


Genitourinary: denies: Dysuria


Female Genitourinary: denies: Vaginal discharge


Musculoskeletal: Muscle pain


Skin: No symptoms reported


Hematologic/Lymphatic: No symptoms reported


Neurological/Psychological: Headaches





Physical Exam





- Vital signs


Vitals: 


                                        











Temp Pulse Resp BP Pulse Ox


 


 98.1 F   88   16   154/83 H  98 


 


 08/26/20 11:04  08/26/20 11:04  08/26/20 11:04  08/26/20 11:04  08/26/20 11:04














- General


General appearance: Appears well


In distress: None





- HEENT


Head: Normocephalic, Atraumatic


Eyes: No: Scleral icterus


Extraocular movements intact: Yes


Pupils: PERRL





- Respiratory


Chest status: Nontender


Breath sounds: Normal





- Cardiovascular


Rhythm: Regular


Heart sounds: Normal auscultation





- Abdominal


Inspection: Obese


Distension: No distension


Bowel sounds: Normal


Tenderness: Nontender





- Extremities


General upper extremity: Normal inspection, Normal ROM


General lower extremity: Normal inspection, Normal ROM.  No: Edema





- Neurological


Neuro grossly intact: Yes


Cognition: Normal


Orientation: AAOx4


Cranial nerves: Normal


Motor strength normal: LUE, RUE, LLE, RLE


Sensory: Normal





- Psychological


Associated symptoms: Normal affect





- Skin


Skin Temperature: Warm





Course





- Re-evaluation


Re-evalutation: 


45-year-old female with multiple complaints.  She has had constant chest pain 

for the past 5 days.  She does not report any exertional symptoms.  No known 

history of heart disease.  Low suspicion for cardiac etiology.  Age and obesity 

give her risk factors.  No previous diagnosis of hypertension, however given her

elevated blood pressure readings, suspect that she does have hypertension.  We 

will give her a dose of amlodipine now.  Potentially her symptoms are from high 

blood pressure.  Given her complaints of fatigue and sweating, will check TSH to

evaluate for thyroid dysfunction.








HEART Score:





History 0      


ECG 0      


Age 1      


Risk Factors   1


Troponin   0





Total: 2





 If HEART score is = 3 AND both tronponin measurments are normal, the 30 day 

risk of a major adverse cardiac event (all-cause mortality, myocardia infarction

or need for coronary revscularization) is < 1% (Sensitivity 100%, %).








08/26/20 14:58


Labs reviewed.  No leukocytosis or left shift.  No acute anemia, hemoglobin 

slightly elevated.  Electrolytes within normal limits.  No elevation of LFTs.  

Initial troponin negative.  TSH slightly elevated, however T4 is within normal 

limits.  Urine is not suggestive of UTI.





08/26/20 15:26


Troponin negative x2





Patient was updated on results.  She reports feeling better and would like to go

home now.  I have written for amlodipine 5 mg daily.  I discussed with her need 

to establish with PCP, I have provided clinic follow-up information for her.





Chest pain in a patient without evidence of cardiac or other serious etiology on

workup today. I discussed with patient that, based on their age, risk factors 

and emergency department testing today, the likelihood that their symptoms are 

related to a heart attack is very low (estimated risk of heart attack or death 

over the next 30 days of less than 1%).  The patient demonstrates decision 

making capacity and has verbalized an understanding of these risks to me. Based 

on this,  the patient has chosen to follow-up as an outpatient. Usual chest pain

return precautions reviewed. The patient states understanding and agreement with

this plan.





Return precautions were given, patient stable at time of discharge.








- Vital Signs


Vital signs: 


                                        











Temp Pulse Resp BP Pulse Ox


 


 98.1 F   88   14   130/81 H  97 


 


 08/26/20 11:04  08/26/20 11:04  08/26/20 15:09  08/26/20 14:00  08/26/20 15:09














- Laboratory


Result Diagrams: 


                                 08/26/20 11:20





                                 08/26/20 11:20


Laboratory results interpreted by me: 


                                        











  08/26/20 08/26/20 08/26/20





  11:20 11:20 13:54


 


Hgb  15.6 H  


 


TSH   4.88 H 


 


Urine Blood    MODERATE H














- Diagnostic Test


Radiology reviewed: Image reviewed, Reports reviewed





- EKG Interpretation by Me


Additional EKG results interpreted by me: 








EKG is interpreted by me.  Normal sinus rhythm with a rate 81.  Narrow QRS, QTC 

within normal limits.  No ST elevation.





Discharge





- Discharge


Clinical Impression: 


 Elevated blood pressure reading





Condition: Stable


Disposition: HOME, SELF-CARE


Additional Instructions: 


Please begin amlodipine for high blood pressure.  Please try to establish with 

PCP, I provided resource for the [facility] and the [facility].  

Please return to the ED for any concerning or worsening symptoms.


Prescriptions: 


Amlodipine Besylate [Norvasc 5 mg Tablet] 5 mg PO DAILY #30 tablet


Forms:  Elevated Blood Pressure


Referrals: 


[facility] [Provider Group] - Follow up as needed

## 2020-08-26 NOTE — RADIOLOGY REPORT (SQ)
EXAM DESCRIPTION:  CHEST SINGLE VIEW



IMAGES COMPLETED DATE/TIME:  8/26/2020 12:07 pm



REASON FOR STUDY:  cp



COMPARISON:  12/3/2019



EXAM PARAMETERS:  NUMBER OF VIEWS: One view.

TECHNIQUE: Single frontal radiographic view of the chest acquired.

RADIATION DOSE: NA

LIMITATIONS: None.



FINDINGS:  LUNGS AND PLEURA: No opacities, masses or pneumothorax. No pleural effusion.

MEDIASTINUM AND HILAR STRUCTURES: No masses.  Contour normal.

HEART AND VASCULAR STRUCTURES: Heart normal in size.  Normal vasculature.

BONES: No acute findings.

HARDWARE: None in the chest.

OTHER: No other significant finding.



IMPRESSION:  NO ACUTE RADIOGRAPHIC FINDING IN THE CHEST.



TECHNICAL DOCUMENTATION:  JOB ID:  [id number]

 2011 [employer]- All Rights Reserved



Reading location - IP/workstation name: [email or web address]
